# Patient Record
Sex: FEMALE | Employment: FULL TIME | URBAN - NONMETROPOLITAN AREA
[De-identification: names, ages, dates, MRNs, and addresses within clinical notes are randomized per-mention and may not be internally consistent; named-entity substitution may affect disease eponyms.]

---

## 2022-04-21 ENCOUNTER — OFFICE VISIT (OUTPATIENT)
Dept: URGENT CARE | Facility: PHYSICIAN GROUP | Age: 67
End: 2022-04-21
Payer: MEDICARE

## 2022-04-21 VITALS
TEMPERATURE: 98.3 F | HEIGHT: 60 IN | BODY MASS INDEX: 23.75 KG/M2 | SYSTOLIC BLOOD PRESSURE: 122 MMHG | RESPIRATION RATE: 14 BRPM | DIASTOLIC BLOOD PRESSURE: 78 MMHG | HEART RATE: 60 BPM | OXYGEN SATURATION: 97 % | WEIGHT: 121 LBS

## 2022-04-21 DIAGNOSIS — H01.131 ECZEMATOUS DERMATITIS OF UPPER AND LOWER EYELIDS OF BOTH EYES: ICD-10-CM

## 2022-04-21 DIAGNOSIS — L29.8 CHRONIC PRURITIC RASH IN ADULT: ICD-10-CM

## 2022-04-21 DIAGNOSIS — H01.132 ECZEMATOUS DERMATITIS OF UPPER AND LOWER EYELIDS OF BOTH EYES: ICD-10-CM

## 2022-04-21 DIAGNOSIS — H01.134 ECZEMATOUS DERMATITIS OF UPPER AND LOWER EYELIDS OF BOTH EYES: ICD-10-CM

## 2022-04-21 DIAGNOSIS — H01.135 ECZEMATOUS DERMATITIS OF UPPER AND LOWER EYELIDS OF BOTH EYES: ICD-10-CM

## 2022-04-21 PROCEDURE — 99204 OFFICE O/P NEW MOD 45 MIN: CPT | Performed by: NURSE PRACTITIONER

## 2022-04-21 RX ORDER — TRIAMCINOLONE ACETONIDE 0.25 MG/G
1 CREAM TOPICAL 2 TIMES DAILY
Qty: 15 G | Refills: 0 | Status: SHIPPED | OUTPATIENT
Start: 2022-04-21 | End: 2022-04-28

## 2022-04-21 ASSESSMENT — ENCOUNTER SYMPTOMS
CONSTITUTIONAL NEGATIVE: 1
BLURRED VISION: 0
FEVER: 0
EYE DISCHARGE: 0
EYE ITCHING: 1

## 2022-04-21 ASSESSMENT — VISUAL ACUITY: OU: 1

## 2022-04-21 NOTE — PATIENT INSTRUCTIONS
Eczema  Eczema is a broad term for a group of skin conditions that cause skin to become rough and inflamed. Each type of eczema has different triggers, symptoms, and treatments. Eczema of any type is usually itchy and symptoms range from mild to severe.  Eczema and its symptoms are not spread from person to person (are not contagious). It can appear on different parts of the body at different times. Your eczema may not look the same as someone else's eczema.  What are the types of eczema?  Atopic dermatitis  This is a long-term (chronic) skin disease that keeps coming back (recurring). Usual symptoms are dry skin and small, solid pimples that may swell and leak fluid (weep).  Contact dermatitis    This happens when something irritates the skin and causes a rash. The irritation can come from substances that you are allergic to (allergens), such as poison ivy, chemicals, or medicines that were applied to your skin.  Dyshidrotic eczema  This is a form of eczema on the hands and feet. It shows up as very itchy, fluid-filled blisters. It can affect people of any age, but is more common before age 40.  Hand eczema    This causes very itchy areas of skin on the palms and sides of the hands and fingers. This type of eczema is common in industrial jobs where you may be exposed to many different types of irritants.  Lichen simplex chronicus  This type of eczema occurs when a person constantly scratches one area of the body. Repeated scratching of the area leads to thickened skin (lichenification). Lichen simplex chronicus can occur along with other types of eczema. It is more common in adults, but may be seen in children as well.  Nummular eczema  This is a common type of eczema. It has no known cause. It typically causes a red, circular, crusty lesion (plaque) that may be itchy. Scratching may become a habit and can cause bleeding. Nummular eczema occurs most often in people of middle-age or older. It most often affects the  "hands.  Seborrheic dermatitis  This is a common skin disease that mainly affects the scalp. It may also affect any oily areas of the body, such as the face, sides of nose, eyebrows, ears, eyelids, and chest. It is marked by small scaling and redness of the skin (erythema). This can affect people of all ages. In infants, this condition is known as \"cradle cap.\"  Stasis dermatitis  This is a common skin disease that usually appears on the legs and feet. It most often occurs in people who have a condition that prevents blood from being pumped through the veins in the legs (chronic venous insufficiency). Stasis dermatitis is a chronic condition that needs long-term management.  How is eczema diagnosed?  Your health care provider will examine your skin and review your medical history. He or she may also give you skin patch tests. These tests involve taking patches that contain possible allergens and placing them on your back. He or she will then check in a few days to see if an allergic reaction occurred.  What are the common treatments?  Treatment for eczema is based on the type of eczema you have. Hydrocortisone steroid medicine can relieve itching quickly and help reduce inflammation. This medicine may be prescribed or obtained over-the-counter, depending on the strength of the medicine that is needed.  Follow these instructions at home:  · Take over-the-counter and prescription medicines only as told by your health care provider.  · Use creams or ointments to moisturize your skin. Do not use lotions.  · Learn what triggers or irritates your symptoms. Avoid these things.  · Treat symptom flare-ups quickly.  · Do not itch your skin. This can make your rash worse.  · Keep all follow-up visits as told by your health care provider. This is important.  Where to find more information  · The American Academy of Dermatology: www.aad.org  · The National Eczema Association: www.nationaleczema.org  Contact a health care provider " if:  · You have serious itching, even with treatment.  · You regularly scratch your skin until it bleeds.  · Your rash looks different than usual.  · Your skin is painful, swollen, or more red than usual.  · You have a fever.  Summary  · There are eight general types of eczema. Each type has different triggers.  · Eczema of any type causes itching that may range from mild to severe.  · Treatment varies based on the type of eczema you have. Hydrocortisone steroid medicine can help with itching and inflammation.  · Protecting your skin is the best way to prevent eczema. Use moisturizers and lotions. Avoid triggers and irritants, and treat flare-ups quickly.  This information is not intended to replace advice given to you by your health care provider. Make sure you discuss any questions you have with your health care provider.  Document Released: 05/03/2018 Document Revised: 11/30/2018 Document Reviewed: 05/03/2018  [employer] Patient Education © 2020 [employer]

## 2022-04-21 NOTE — PROGRESS NOTES
Subjective:     Estella Mckinney is a 67 y.o. female who presents for Eye Problem (Redness around her eyes on an off 2 years)       Eye Problem   Both eyes are affected.This is a chronic problem. The problem has been waxing and waning. There was no injury mechanism. She wears contacts. Associated symptoms include itching. Pertinent negatives include no blurred vision, eye discharge or fever.   Rash  This is a chronic problem. Episode onset: 2 years. The problem has been gradually worsening since onset. The rash is characterized by dryness, redness, swelling and itchiness. She was exposed to nothing. Pertinent negatives include no fever. Past treatments include antibiotic cream. The treatment provided mild relief. Her past medical history is significant for allergies. There is no history of eczema.     Has been rubbing/scratching.    Patient was screened prior to rooming and denied COVID-19 diagnosis or contact with a person who has been diagnosed or is suspected to have COVID-19. During this visit, appropriate PPE was worn, hand hygiene was performed, and the patient and any visitors were masked.     PMH:  has a past medical history of No active medical problems (4/21/2015).    MEDS:   Current Outpatient Medications:   •  triamcinolone acetonide (KENALOG) 0.025 % Cream, Apply 1 Application topically 2 times a day for 7 days., Disp: 15 g, Rfl: 0  •  erythromycin 5 MG/GM OINT, Place 1 Application in right eye 4 times a day. (Patient not taking: Reported on 4/21/2022), Disp: 1 Tube, Rfl: 0    ALLERGIES: No Known Allergies    SURGHX: History reviewed. No pertinent surgical history.    SOCHX:  reports that she has never smoked. She has never used smokeless tobacco. She reports that she does not drink alcohol and does not use drugs.     FH: Reviewed with patient, not pertinent to this visit.    Review of Systems   Constitutional: Negative.  Negative for fever and malaise/fatigue.   Eyes: Positive for itching. Negative  for blurred vision and discharge.   Skin: Positive for itching and rash.   All other systems reviewed and are negative.    Additional details per HPI.      Objective:     /78   Pulse 60   Temp 36.8 °C (98.3 °F) (Temporal)   Resp 14   Ht 1.524 m (5')   Wt 54.9 kg (121 lb)   SpO2 97%   BMI 23.63 kg/m²     Physical Exam  Vitals reviewed.   Constitutional:       General: She is not in acute distress.     Appearance: She is well-developed. She is not ill-appearing or toxic-appearing.   Eyes:      General: Vision grossly intact.         Right eye: No discharge.         Left eye: No discharge.      Extraocular Movements: Extraocular movements intact.      Conjunctiva/sclera: Conjunctivae normal.      Pupils: Pupils are equal, round, and reactive to light.      Comments: Erythematous, inflamed, dry, scaly patches on corners of eyelids   Cardiovascular:      Rate and Rhythm: Normal rate.   Pulmonary:      Effort: Pulmonary effort is normal. No respiratory distress.   Musculoskeletal:         General: No deformity. Normal range of motion.      Cervical back: Normal range of motion.   Skin:     General: Skin is warm and dry.      Coloration: Skin is not pale.   Neurological:      Mental Status: She is alert and oriented to person, place, and time.      Sensory: No sensory deficit.      Motor: No weakness.   Psychiatric:         Behavior: Behavior normal. Behavior is cooperative.       Assessment/Plan:     1. Eczematous dermatitis of upper and lower eyelids of both eyes  - triamcinolone acetonide (KENALOG) 0.025 % Cream; Apply 1 Application topically 2 times a day for 7 days.  Dispense: 15 g; Refill: 0  - Referral to Dermatology    2. Chronic pruritic rash in adult  - Referral to Dermatology    Rx as above sent electronically.    Differential diagnosis, natural history, supportive care, over-the-counter symptom management per 's instructions, close monitoring, and indications for immediate follow-up  discussed.     All questions answered. Patient agrees with the plan of care.    Discharge summary provided.     Billing note: chronic illness with exacerbation/progression; prescription drug management